# Patient Record
(demographics unavailable — no encounter records)

---

## 2024-10-25 NOTE — DISCUSSION/SUMMARY
[FreeTextEntry1] : MIL / A FIB ORDER SUPPLIES, MODERATE POOR STUDY/ SE HST REGROUP AFTER ABOVE  CARDIO // EPS NOTED

## 2025-06-11 NOTE — HISTORY OF PRESENT ILLNESS
[FreeTextEntry1] : The patient has been having further eye issues. She is going for Trabeculectomy. She has not had clinical AF since her last visit

## 2025-06-11 NOTE — PHYSICAL EXAM
[General Appearance - Well Developed] : well developed [Normal Appearance] : normal appearance [Well Groomed] : well groomed [General Appearance - Well Nourished] : well nourished [No Deformities] : no deformities [General Appearance - In No Acute Distress] : no acute distress [Normal Conjunctiva] : the conjunctiva exhibited no abnormalities [Eyelids - No Xanthelasma] : the eyelids demonstrated no xanthelasmas [Normal Oral Mucosa] : normal oral mucosa [No Oral Pallor] : no oral pallor [No Oral Cyanosis] : no oral cyanosis [Respiration, Rhythm And Depth] : normal respiratory rhythm and effort [Exaggerated Use Of Accessory Muscles For Inspiration] : no accessory muscle use [Auscultation Breath Sounds / Voice Sounds] : lungs were clear to auscultation bilaterally [Abdomen Soft] : soft [Abdomen Tenderness] : non-tender [Abdomen Mass (___ Cm)] : no abdominal mass palpated [Abnormal Walk] : normal gait [Gait - Sufficient For Exercise Testing] : the gait was sufficient for exercise testing [Nail Clubbing] : no clubbing of the fingernails [Cyanosis, Localized] : no localized cyanosis [Petechial Hemorrhages (___cm)] : no petechial hemorrhages [] : no ischemic changes [Normal Rate] : normal [Rhythm Regular] : regular [2+] : left 2+ [No Pitting Edema] : no pitting edema present [FreeTextEntry1] : No JVD  [Rt] : no varicose veins of the right leg [Lt] : no varicose veins of the left leg

## 2025-06-11 NOTE — CARDIOLOGY SUMMARY
[___] : [unfilled] [de-identified] : 9- Sinus Bradycarda  12-6-2021 Sinus Bradycardia  4- Sinus Bradycardia  4-9-2023 Sinus Bradycardia 3- SB  9- NSR NS T wave change  3- Sinus bradycardia NS  T wave change  6- Sinus bradycardia First degree AV block  [de-identified] : 10-8-2021 PAF .  [de-identified] : 10-6-2021 Normal Lv systolic function mild MR mild TR . Small posterior pericardial effusion vs epicardial fat .  8-2023 Normal Lv systolic ufnction . Mild MRand TR . No pericardial effusion 4-3-2024 EF 55-60% mildly enlarged LA

## 2025-06-11 NOTE — REASON FOR VISIT
[Arrhythmia/ECG Abnorrmalities] : arrhythmia/ECG abnormalities [Hyperlipidemia] : hyperlipidemia [Hypertension] : hypertension [Consultation] : a consultation regarding [Mitral Regurgitation] : mitral regurgitation [Palpitations] : palpitations [FreeTextEntry3] : Dr. Bolden

## 2025-06-11 NOTE — ASSESSMENT
[FreeTextEntry1] : The patient has PAF and MIL not using CPAP , HTN and hyperlipidemia who has had issues with her eyes and glaucoma. The patient is going for Trabeculectomy. The patient has more than 4 METS exercise capacity and has had previous ischemia work up which was negative. She is an intermediate cardiac risk undergoing a minor to intermediate risk procedure. It there is a contraindication to Eliquis and A/C may stop Eliquis for 3 days and restart as soon as possible after the procedure.She is on Metoprolol and this should be continued perioperatively